# Patient Record
Sex: FEMALE | Race: BLACK OR AFRICAN AMERICAN
[De-identification: names, ages, dates, MRNs, and addresses within clinical notes are randomized per-mention and may not be internally consistent; named-entity substitution may affect disease eponyms.]

---

## 2019-03-28 NOTE — BD
BONE DENSITOMETRY:

 

Date:  03/28/19

 

INDICATION:

73-year-old female for postmenopausal osteoporosis screening. 

 

FINDINGS:

 

Lumbar Spine:       BMD (g/cm2)

    L1              0.796         T-Score:  -1.8 

    L2              0.807         T-Score:  -2.0 

    L3              0.753         T-Score:  -3.0 

    L4              0.775         T-Score:  -2.6 

    L1-L4           0.782         T-Score:  -2.4  

 

Femoral Neck:       0.832         T-Score:  -0.2 

Total Femur:        0.979         T-Score:  0.3 

 

IMPRESSION: 

1.  Bone mineral density of lumbar spine indicates osteopenia; however, the L3 and L4 levels are at t
he osteoporotic range. 

 

2.  Bone mineral density of the femoral neck within normal range. 

 

 

POS: DILIP

## 2019-04-12 NOTE — MMO
Bilateral MAMMO Bilat Screen [facility].

 

CLINICAL HISTORY:

Patient is 73 years old and is seen for screening. The patient has no family

history of breast cancer.  The patient has no personal history of cancer.

 

VIEWS:

The views performed were:  bilateral craniocaudal with tomosynthesis and

bilateral mediolateral oblique with tomosynthesis.

 

FILMS COMPARED:

The present examination has been compared to prior imaging studies performed at

[facility] [place], 08/27/2009, 12/29/2010,

02/03/2012 and 09/07/2016.

 

MAMMOGRAM FINDINGS:

There are scattered fibroglandular densities.

 

There are no suspicious masses, suspicious calcifications, or new areas of

architectural distortion.

 

IMPRESSION:

THERE IS NO MAMMOGRAPHIC EVIDENCE OF MALIGNANCY.

 

A ROUTINE FOLLOW-UP MAMMOGRAM IN 1 YEAR IS RECOMMENDED.

 

THE RESULTS OF THIS EXAM WERE SENT TO THE PATIENT.

 

ACR BI-RADS Category 1 - Negative

 

MAMMOGRAPHY NOTE:

 1. A negative mammogram report should not delay a biopsy if a dominant of

 clinically suspicious mass is present.

 2. Approximately 10% to 15% of breast cancers are not detected by

 mammography.

 3. Adenosis and dense breasts may obscure an underlying neoplasm.

## 2019-10-24 NOTE — HP
ANTICIPATED DATE OF SURGERY:  10/28/2019



HISTORY OF PRESENT ILLNESS:  Ms. Sotelo is a 74-year-old  female

with history of previous hysterectomy who has been having symptoms of pelvic

prolapse.  She feels a bulge in her vagina and is worsened with standing.  She has

difficulty with voiding with urinary hesitancy due to the prolapse.  She has no

stress incontinence reported.  She has initially been referred to me by Dr. Maureen Neville of Urology. 



PAST MEDICAL HISTORY:  Significant for adult-onset diabetes, hyperlipidemia,

osteoporosis, chronic hypertension. 



PAST SURGICAL HISTORY:  Tonsillectomy, total hysterectomy for uterine fibroids, TMJ

repair of a joint disk. 



SOCIAL HISTORY:  Minimal alcohol use.  Nonsmoker.   and has 4 children.



CURRENT MEDICATIONS:  

1. Alendronate 70 mg tablet weekly.

2. Atorvastatin 20 mg tablet daily.

3. Invokana 300 mg tablet daily.

4. Januvia 100 mg tablet daily.

5. Lisinopril 20 mg tablet daily.



FAMILY HISTORY:  Noncontributory.



PHYSICAL EXAMINATION:

VITAL SIGNS:  Height 5 feet 1 inch, weight 125 pounds.  BMI 23.6.  Blood pressure

142/84, pulse 70, respirations 18, O2 saturation on room air 99%. 

HEENT:  Within normal limits. 

CHEST:  Clear to auscultation. 

HEART:  Regular rate and rhythm.  S1, S2 heart sounds.  No murmurs, rubs, or

gallops. 

ABDOMEN:  Soft, nontender, nondistended with no palpable masses. 

PELVIC:  Vulva and vagina had no lesions.  Bladder, urethra, no lesions.  Vagina

showed midline grade 2 cystocele and grade 3 rectocele.  Upper vaginal vault

appeared adequately supported.  Cervix and uterus were surgically absent.  Adnexa

nontender with no masses. 



ASSESSMENT:  This is a 74-year-old  female with prior hysterectomy

with grade 2 cystocele and grade 3 rectocele. 



PLAN:  Plan is to proceed with anterior and posterior repair on 10/28/2019.  Risks

and benefits of procedure have been discussed in detail.  She is set for surgery. 







Job ID:  [id number]

## 2019-10-28 NOTE — OP
DATE OF PROCEDURE:  10/28/2019



PREOPERATIVE DIAGNOSIS:  A 74-year-old -American female, prior hysterectomy

with symptomatic grade 2 cystocele and grade 3 rectocele. 



POSTOPERATIVE DIAGNOSIS:  A 74-year-old -American female, prior hysterectomy

with symptomatic grade 2 cystocele and grade 3 rectocele. 



PROCEDURE PERFORMED:  Anterior and posterior repair.



ASSISTANT:  ROSINA Borjas



ANESTHESIA:  General endotracheal.



ESTIMATED BLOOD LOSS:  25 mL.



COMPLICATIONS:  None.



COUNTS:  Correct x2.



PATHOLOGY:  None.



DISPOSITION:  Recovery room, stable.



DESCRIPTION OF PROCEDURE:  The patient previously received informed consent in

regard to surgery.  She was taken back to the operating room, where she received a

general endotracheal anesthetic agent without complications.  She was placed in the

dorsal lithotomy position with use of Jerman stirrups and then prepped and draped in

usual sterile fascial.  Hogan catheter was placed at this time.  A weighted speculum

was placed in the vagina and the anterior edges of the vaginal cuff were grasped

with 2 Allis clamps.  The anterior vaginal mucosa was infiltrated with approximately

10 mL of 1% lidocaine with epinephrine.  A midline vertical mucosal incision in

anterior mucosa was made with Metzenbaum scissors.  This was carried up about 1.5 cm

from the urethral meatus.  The edges of the vaginal mucosa were grasped with Allis

clamps and retracted backside for exposure by my assistants.  The cystocele was then

dissected both bluntly and sharply reducing the cystocele in its entirety.  Once

this was accomplished, the endopelvic fascia was replicated in the midline with 2-0

Vicryl sutures in figure-of-eight stitch fashion.  This first started cephalad and

then worked towards the introitus reducing the cystocele.  The anterior vaginal

mucosa was then closed incorporating some of the endopelvic fascia to rid the dead

space with 2-0 Vicryl suture in figure-of-eight stitch fashion securing hemostasis. 



Attention was then turned to the posterior repair.  The weighted speculum had been

removed.  The vaginal introitus mucosa was grasped at the 4 and 8 o'clock position

with Allis clamps.  The posterior vaginal mucosa was infiltrated with 1% lidocaine

with epinephrine up to the vaginal cuff line.  The midline incision was again made

of the vaginal mucosa up towards the cuff line with Metzenbaum scissors.  The

rectocele was dissected both sharply and bluntly and the edges of the mucosa were

cut towards the vaginal cuff.  The edges were grasped with Allis clamps for counter

traction by my assistance and the rectocele was reduced.  We then placed a stay

suture in the upper apex of the vaginal mucosa with 2-0 Vicryl.  Then, the

endopelvic fascia starting again cephalad was plicated with figure-of-eight sutures

of 2-0 Vicryl suture replicating the endopelvic fascia in the midline.  This was

carried out towards the opening of the introitus until this complete rectocele was

reduced.  The excess vaginal mucosa was somewhat trimmed and then the posterior

vaginal mucosa was closed with interrupted figure-of-eight stitches with 2-0 Vicryl

incorporating the endopelvic fascia reducing the dead space.  The vaginal vault was

then packed with a moistened Kerlix and trimmed.  The patient was then awakened from

anesthesia and transferred to recovery room in stable condition. 







Job ID:  [id number]

## 2019-10-29 NOTE — PDOC.EVN
Event Note





- Event Note


Event Note: 





Tolerating diet. Ambulating . Voiding well. PVR's < 100 ml the past few. good 

pain control on tylenol. Vitals are stable and normal. 


A/P Post op day 1 from A&P repair doing. well. D/c home. F/u in 4 weeks.

## 2019-10-29 NOTE — DIS
DATE OF ADMISSION:  10/28/2019



DATE OF DISCHARGE:  10/29/2019



DIAGNOSIS:  Grade 2 cystocele, grade 3 rectocele.



PROCEDURE PERFORMED:  Anterior and posterior repair.



COMORBID DIAGNOSES:  Adult-onset diabetes and hypertension.



SUMMARY OF HOSPITAL COURSE:  Ms. Sotelo is a 74-year-old  female

with prior hysterectomy, who was having symptomatic pelvic organ prolapse with

cystocele and rectocele.  She had issues with inability to completely void and empty

her bladder.  She underwent anterior and posterior repair on 10/28/2019.

Postoperatively, the patient has done well.  Vital signs remained stable.  She is

ambulating and voiding.  Trials were initiated.  On postop day #1, she had

resolution of any retained urinary retention with postvoid residuals less than 100

mL x2.  Voids were over 300 to 500 at time.  She was discharged home on the

afternoon of postop day #1 and has a followup in 4 weeks. 







Job ID:  [id number]

## 2020-05-12 ENCOUNTER — HOSPITAL ENCOUNTER (OUTPATIENT)
Dept: HOSPITAL 92 - BICMAMMO | Age: 75
Discharge: HOME | End: 2020-05-12
Attending: FAMILY MEDICINE
Payer: MEDICARE

## 2020-05-12 DIAGNOSIS — Z12.31: Primary | ICD-10-CM

## 2020-05-12 PROCEDURE — 77067 SCR MAMMO BI INCL CAD: CPT

## 2020-05-12 PROCEDURE — 77063 BREAST TOMOSYNTHESIS BI: CPT

## 2020-05-12 NOTE — MMO
Bilateral MAMMO Bilat Screen [facility].

 

CLINICAL HISTORY:

Patient is 75 years old and is seen for screening. The patient has no family

history of breast cancer.  The patient has no personal history of cancer.

 

VIEWS:

The views performed were:  bilateral craniocaudal with tomosynthesis and

bilateral mediolateral oblique with tomosynthesis.

 

FILMS COMPARED:

The present examination has been compared to prior imaging studies performed at

[facility] [place] on [place]29/[place], 02/03/2012 and 09/07/2016, and at

[facility] on 03/28/2019.

 

This study has been interpreted with the assistance of computer-aided detection.

 

MAMMOGRAM FINDINGS:

There are scattered fibroglandular densities.

 

There are no suspicious masses, suspicious calcifications, or new areas of

architectural distortion.

 

IMPRESSION:

THERE IS NO MAMMOGRAPHIC EVIDENCE OF MALIGNANCY.

 

A ROUTINE FOLLOW-UP MAMMOGRAM IN 1 YEAR IS RECOMMENDED.

 

THE RESULTS OF THIS EXAM WERE SENT TO THE PATIENT.

 

ACR BI-RADS Category 1 - Negative

 

MAMMOGRAPHY NOTE:

 1. A negative mammogram report should not delay a biopsy if a dominant of

 clinically suspicious mass is present.

 2. Approximately 10% to 15% of breast cancers are not detected by

 mammography.

 3. Adenosis and dense breasts may obscure an underlying neoplasm.

 

 

Reported by: HAYDE SALAZAR MD

Electonically Signed: [id number]

## 2021-08-23 ENCOUNTER — HOSPITAL ENCOUNTER (OUTPATIENT)
Dept: HOSPITAL 92 - CSHLAB | Age: 76
Discharge: HOME | End: 2021-08-23
Attending: INTERNAL MEDICINE
Payer: MEDICARE

## 2021-08-23 DIAGNOSIS — Z12.11: ICD-10-CM

## 2021-08-23 DIAGNOSIS — Z20.822: Primary | ICD-10-CM

## 2021-08-23 PROCEDURE — U0005 INFEC AGEN DETEC AMPLI PROBE: HCPCS

## 2021-08-23 PROCEDURE — U0003 INFECTIOUS AGENT DETECTION BY NUCLEIC ACID (DNA OR RNA); SEVERE ACUTE RESPIRATORY SYNDROME CORONAVIRUS 2 (SARS-COV-2) (CORONAVIRUS DISEASE [COVID-19]), AMPLIFIED PROBE TECHNIQUE, MAKING USE OF HIGH THROUGHPUT TECHNOLOGIES AS DESCRIBED BY CMS-2020-01-R: HCPCS

## 2021-11-24 ENCOUNTER — HOSPITAL ENCOUNTER (OUTPATIENT)
Dept: HOSPITAL 92 - BICRAD | Age: 76
Discharge: HOME | End: 2021-11-24
Attending: INTERNAL MEDICINE
Payer: MEDICARE

## 2021-11-24 DIAGNOSIS — M81.0: Primary | ICD-10-CM

## 2021-11-24 PROCEDURE — 72072 X-RAY EXAM THORAC SPINE 3VWS: CPT

## 2022-07-29 ENCOUNTER — HOSPITAL ENCOUNTER (OUTPATIENT)
Dept: HOSPITAL 92 - CSHMAMMO | Age: 77
Discharge: HOME | End: 2022-07-29
Attending: FAMILY MEDICINE
Payer: MEDICARE

## 2022-07-29 DIAGNOSIS — Z12.31: Primary | ICD-10-CM

## 2022-07-29 PROCEDURE — 77067 SCR MAMMO BI INCL CAD: CPT

## 2022-07-29 PROCEDURE — 77063 BREAST TOMOSYNTHESIS BI: CPT

## 2023-08-30 ENCOUNTER — HOSPITAL ENCOUNTER (OUTPATIENT)
Dept: HOSPITAL 92 - BICMAMMO | Age: 78
Discharge: HOME | End: 2023-08-30
Attending: INTERNAL MEDICINE
Payer: MEDICARE

## 2023-08-30 DIAGNOSIS — M85.88: ICD-10-CM

## 2023-08-30 DIAGNOSIS — M81.0: Primary | ICD-10-CM

## 2023-08-30 PROCEDURE — 77080 DXA BONE DENSITY AXIAL: CPT

## 2024-08-21 ENCOUNTER — HOSPITAL ENCOUNTER (OUTPATIENT)
Dept: HOSPITAL 92 - BICMAMMO | Age: 79
Discharge: HOME | End: 2024-08-21
Attending: FAMILY MEDICINE
Payer: MEDICARE

## 2024-08-21 DIAGNOSIS — Z12.31: Primary | ICD-10-CM

## 2024-08-21 PROCEDURE — 77067 SCR MAMMO BI INCL CAD: CPT

## 2024-08-21 PROCEDURE — 77063 BREAST TOMOSYNTHESIS BI: CPT
